# Patient Record
Sex: FEMALE | Race: BLACK OR AFRICAN AMERICAN | NOT HISPANIC OR LATINO | URBAN - METROPOLITAN AREA
[De-identification: names, ages, dates, MRNs, and addresses within clinical notes are randomized per-mention and may not be internally consistent; named-entity substitution may affect disease eponyms.]

---

## 2020-10-21 ENCOUNTER — EMERGENCY (EMERGENCY)
Facility: HOSPITAL | Age: 60
LOS: 1 days | Discharge: HOME | End: 2020-10-21
Attending: EMERGENCY MEDICINE | Admitting: EMERGENCY MEDICINE
Payer: MEDICAID

## 2020-10-21 VITALS
OXYGEN SATURATION: 100 % | HEART RATE: 73 BPM | SYSTOLIC BLOOD PRESSURE: 153 MMHG | TEMPERATURE: 98 F | DIASTOLIC BLOOD PRESSURE: 77 MMHG | RESPIRATION RATE: 18 BRPM

## 2020-10-21 PROCEDURE — 73564 X-RAY EXAM KNEE 4 OR MORE: CPT | Mod: 26,LT

## 2020-10-21 PROCEDURE — 99283 EMERGENCY DEPT VISIT LOW MDM: CPT

## 2020-10-21 RX ORDER — IBUPROFEN 200 MG
400 TABLET ORAL ONCE
Refills: 0 | Status: COMPLETED | OUTPATIENT
Start: 2020-10-21 | End: 2020-10-21

## 2020-10-21 RX ADMIN — Medication 400 MILLIGRAM(S): at 12:26

## 2020-10-21 NOTE — ED PROVIDER NOTE - CLINICAL SUMMARY MEDICAL DECISION MAKING FREE TEXT BOX
59 y/o female with a hx pre DM, HTN, HLD presents to the ER c/o left knee pain x 1 week.  Pt is well appearing NAD, will obtain x-ray, pain control, follow up Orthopedics.

## 2020-10-21 NOTE — ED PROVIDER NOTE - NSFOLLOWUPINSTRUCTIONS_ED_ALL_ED_FT
Follow up with orthopedics in 1-2 days.  (See list attached)    Rest, Ice 3-4 x a day.  Ace wrap for support use cane to ambulate.  Take Ibuprofen 400mg orally every 6 hours as needed for pain take with food.    Return to the ER for any persistent/worsening or new symptoms weakness, numbness or any concerning symptoms.

## 2020-10-21 NOTE — ED ADULT TRIAGE NOTE - CHIEF COMPLAINT QUOTE
Arrives from home for left knee pain and swelling, reports was jumping rope last thursday and feeling her left knee give out. Knee is swollen and pain is unrelieved by tylenol. PMH HTN

## 2020-10-21 NOTE — ED PROVIDER NOTE - PHYSICAL EXAMINATION
Left Knee: NV intact, FROM, mild swelling, +TTP at medial aspect of knee, no redness, no temp change.

## 2020-10-21 NOTE — ED PROVIDER NOTE - OBJECTIVE STATEMENT
59 y/o female with a hx pre DM, HTN, HLD presents to the ER c/o left knee pain x 1 week.  Pt states she injured her knee while jumping rope.  Pt denies fall/direct trauma, weakness, numbness, tingling.  Pt reports mild swelling.  Pt took Tylenol last night for pain. 59 y/o female with a hx pre DM, HTN, HLD presents to the ER c/o left knee pain x 1 week.  Pt states she injured her knee while jumping rope.  Pt denies fall/direct trauma, weakness, numbness, tingling.  Pt reports mild swelling.  Pt took Tylenol last night for pain.    Attendinyo female presents with left knee pain while jumping rope. no fall or direct trauma to the knee

## 2020-10-21 NOTE — ED PROVIDER NOTE - PATIENT PORTAL LINK FT
You can access the FollowMyHealth Patient Portal offered by [facility] by registering at the following website: [email or web address]. By joining [employer]’s FollowMyHealth portal, you will also be able to view your health information using other applications (apps) compatible with our system.